# Patient Record
Sex: MALE | Race: WHITE | NOT HISPANIC OR LATINO | Employment: OTHER | URBAN - METROPOLITAN AREA
[De-identification: names, ages, dates, MRNs, and addresses within clinical notes are randomized per-mention and may not be internally consistent; named-entity substitution may affect disease eponyms.]

---

## 2018-11-21 ENCOUNTER — OFFICE VISIT (OUTPATIENT)
Dept: PODIATRY | Facility: CLINIC | Age: 53
End: 2018-11-21
Payer: COMMERCIAL

## 2018-11-21 VITALS
WEIGHT: 245 LBS | DIASTOLIC BLOOD PRESSURE: 81 MMHG | HEART RATE: 102 BPM | BODY MASS INDEX: 31.44 KG/M2 | HEIGHT: 74 IN | TEMPERATURE: 98 F | SYSTOLIC BLOOD PRESSURE: 118 MMHG

## 2018-11-21 DIAGNOSIS — E11.9 TYPE 2 DIABETES MELLITUS WITHOUT COMPLICATION, WITHOUT LONG-TERM CURRENT USE OF INSULIN: ICD-10-CM

## 2018-11-21 DIAGNOSIS — L97.511 ULCER OF GREAT TOE, RIGHT, LIMITED TO BREAKDOWN OF SKIN: Primary | ICD-10-CM

## 2018-11-21 PROCEDURE — 99214 OFFICE O/P EST MOD 30 MIN: CPT | Mod: S$GLB,,, | Performed by: PODIATRIST

## 2018-11-21 PROCEDURE — 99999 PR PBB SHADOW E&M-NEW PATIENT-LVL III: CPT | Mod: PBBFAC,,, | Performed by: PODIATRIST

## 2018-11-21 PROCEDURE — 3008F BODY MASS INDEX DOCD: CPT | Mod: S$GLB,,, | Performed by: PODIATRIST

## 2018-11-21 RX ORDER — METAXALONE 800 MG/1
TABLET ORAL
COMMUNITY
End: 2021-10-29

## 2018-11-21 RX ORDER — MUPIROCIN 20 MG/G
OINTMENT TOPICAL
Refills: 0 | COMMUNITY
Start: 2018-11-15

## 2018-11-21 RX ORDER — METOPROLOL TARTRATE 25 MG/1
TABLET, FILM COATED ORAL
Refills: 2 | COMMUNITY
Start: 2018-11-13

## 2018-11-21 RX ORDER — INSULIN DEGLUDEC 100 U/ML
INJECTION, SOLUTION SUBCUTANEOUS
Refills: 1 | COMMUNITY
Start: 2018-11-07 | End: 2021-10-29

## 2018-11-21 RX ORDER — DIAZEPAM 2 MG/1
TABLET ORAL
COMMUNITY

## 2018-11-21 RX ORDER — PANTOPRAZOLE SODIUM 40 MG/1
TABLET, DELAYED RELEASE ORAL
Refills: 2 | COMMUNITY
Start: 2018-10-25

## 2018-11-21 RX ORDER — ZOLPIDEM TARTRATE 10 MG/1
TABLET ORAL
COMMUNITY
End: 2021-10-29

## 2018-11-21 RX ORDER — ERGOCALCIFEROL 1.25 MG/1
CAPSULE ORAL
COMMUNITY

## 2018-11-21 RX ORDER — FENOFIBRATE 134 MG/1
CAPSULE ORAL
COMMUNITY
End: 2021-10-29 | Stop reason: SDUPTHER

## 2018-11-21 RX ORDER — MELOXICAM 7.5 MG/1
TABLET ORAL
COMMUNITY
End: 2021-10-29

## 2018-11-21 RX ORDER — PEN NEEDLE, DIABETIC 32 GX 1/6"
NEEDLE, DISPOSABLE MISCELLANEOUS
Refills: 5 | COMMUNITY
Start: 2018-09-18

## 2018-11-21 RX ORDER — PRAVASTATIN SODIUM 20 MG/1
TABLET ORAL
COMMUNITY

## 2018-11-21 RX ORDER — ESCITALOPRAM OXALATE 20 MG/1
TABLET ORAL
COMMUNITY

## 2018-11-21 RX ORDER — INSULIN DEGLUDEC 100 U/ML
INJECTION, SOLUTION SUBCUTANEOUS
COMMUNITY
End: 2021-10-29

## 2018-11-21 RX ORDER — LISINOPRIL 2.5 MG/1
TABLET ORAL
COMMUNITY
End: 2021-10-29

## 2018-11-21 RX ORDER — GABAPENTIN 800 MG/1
TABLET ORAL
COMMUNITY
End: 2021-10-29

## 2018-11-21 RX ORDER — CLINDAMYCIN PHOSPHATE 10 MG/G
GEL TOPICAL
Refills: 0 | COMMUNITY
Start: 2018-09-07

## 2018-11-21 RX ORDER — HYDROCODONE BITARTRATE AND ACETAMINOPHEN 10; 325 MG/1; MG/1
TABLET ORAL
COMMUNITY

## 2018-11-21 RX ORDER — LOVASTATIN 20 MG/1
TABLET ORAL
Refills: 2 | COMMUNITY
Start: 2018-11-13

## 2018-11-21 RX ORDER — INSULIN ASPART 100 [IU]/ML
INJECTION, SOLUTION INTRAVENOUS; SUBCUTANEOUS
Refills: 1 | COMMUNITY
Start: 2018-11-07

## 2018-11-21 RX ORDER — METFORMIN HYDROCHLORIDE 1000 MG/1
TABLET ORAL
COMMUNITY
End: 2021-10-29

## 2018-11-21 RX ORDER — DIVALPROEX SODIUM 500 MG/1
TABLET, DELAYED RELEASE ORAL
COMMUNITY
End: 2021-10-29 | Stop reason: SDUPTHER

## 2018-11-21 RX ORDER — FENOFIBRATE 134 MG/1
CAPSULE ORAL
Refills: 2 | COMMUNITY
Start: 2018-10-25

## 2018-11-21 RX ORDER — DOXYCYCLINE 100 MG/1
CAPSULE ORAL
Refills: 0 | COMMUNITY
Start: 2018-11-19 | End: 2021-10-29

## 2018-11-25 PROBLEM — L97.511 ULCER OF GREAT TOE, RIGHT, LIMITED TO BREAKDOWN OF SKIN: Status: ACTIVE | Noted: 2018-11-25

## 2018-11-25 PROBLEM — E11.9 TYPE 2 DIABETES MELLITUS WITHOUT COMPLICATION, WITHOUT LONG-TERM CURRENT USE OF INSULIN: Status: ACTIVE | Noted: 2018-11-25

## 2018-11-25 NOTE — PROGRESS NOTES
Subjective:       Patient ID: Seng Last is a 53 y.o. male.    Chief Complaint: Nail Problem; Foot Problem; Diabetes Mellitus; and Follow-up    Patient presents today for diabetic evaluation he states his diabetes has been up and down and is not very well controlled he relates that he had an ingrown toenail on his right great toe he states the nail was very loose he ripped the nail off it has subsequently become infected he states that he was initially placed on Cipro by primary care later this was changed to doxycycline which she is currently on.  Patient does have pain where he has a wound and signs of infection dorsal nail bed right hallux.  Patient also has toenail problems with an ingrown toenail on the left hallux.  HPI  Review of Systems   Musculoskeletal: Positive for arthralgias.   Neurological: Positive for numbness.   All other systems reviewed and are negative.      Objective:      Physical Exam   Constitutional: He appears well-developed and well-nourished.   Cardiovascular:   Pulses:       Dorsalis pedis pulses are 1+ on the right side, and 1+ on the left side.        Posterior tibial pulses are 1+ on the right side, and 1+ on the left side.   Pulmonary/Chest: Effort normal.   Musculoskeletal: Normal range of motion. He exhibits edema and tenderness.        Right foot: There is deformity.        Left foot: There is deformity.   Feet:   Right Foot:   Protective Sensation: 4 sites tested. 3 sites sensed.   Skin Integrity: Positive for ulcer, skin breakdown and erythema.   Left Foot:   Protective Sensation: 4 sites tested. 3 sites sensed.   Skin Integrity: Positive for erythema.   Neurological: He is alert.   Skin: Skin is warm. Capillary refill takes 2 to 3 seconds. There is erythema.   Psychiatric: He has a normal mood and affect. His behavior is normal. Judgment and thought content normal.   Nursing note and vitals reviewed.      On evaluation patient's nail plate on the right hallux is  completely missing there are signs of infection overlying the nail bed there is fibrous tissue with a thick serous drainage emitting from the right hallux nail bed with obvious signs of ulceration and infection the patient's left hallux nail is noted to be ingrown patient does have limited discomfort secondary to early neuropathy protective sensation is noted to be intact.  Assessment:       1. Ulcer of great toe, right, limited to breakdown of skin    2. Type 2 diabetes mellitus without complication, without long-term current use of insulin        Plan:         Following a complete diabetic evaluation patient was advised under no circumstances should he be ripping a toenail off obviously removing the toenail from the right hallux is cause an infection of the nail bed I did not perform a culture and sensitivity today patient has been on Cipro this was subsequently changed by primary care per a culture to doxycycline patient is currently on the doxycycline at this time so I did not perform a culture of the area the area was thoroughly cleaned with Dakin solution and Xeroform with a well-padded dressing was applied this needs to be changed every day patient was provided the dressing supply materials in Dakin solution needed I also removed an ingrown toenail from the left hallux.  Patient advised any increased redness swelling pain of the right great toe he is to contact me immediately I plan to see him for follow-up in 1 week patient indicated today his blood sugars have been up and down and not well controlled patient is at significant risk for infection and osteomyelitis with related complications because of his uncontrolled diabetes.  Total face-to-face time including discussion evaluation wound care and treatment equaled 25 min I did gently agitated the surface of the nail bed on the right removing a lot of the fibrous tissue which allowed for better cleaning of the area.  Patient advised this area will likely get  macerated with a Xeroform but at this point we need to keep the nail bed preserved and protected.

## 2018-11-28 ENCOUNTER — OFFICE VISIT (OUTPATIENT)
Dept: PODIATRY | Facility: CLINIC | Age: 53
End: 2018-11-28
Payer: COMMERCIAL

## 2018-11-28 VITALS
TEMPERATURE: 98 F | BODY MASS INDEX: 31.44 KG/M2 | DIASTOLIC BLOOD PRESSURE: 85 MMHG | WEIGHT: 245 LBS | SYSTOLIC BLOOD PRESSURE: 126 MMHG | HEIGHT: 74 IN | HEART RATE: 76 BPM

## 2018-11-28 DIAGNOSIS — E11.9 TYPE 2 DIABETES MELLITUS WITHOUT COMPLICATION, WITHOUT LONG-TERM CURRENT USE OF INSULIN: ICD-10-CM

## 2018-11-28 DIAGNOSIS — L97.511 ULCER OF GREAT TOE, RIGHT, LIMITED TO BREAKDOWN OF SKIN: Primary | ICD-10-CM

## 2018-11-28 PROCEDURE — 99213 OFFICE O/P EST LOW 20 MIN: CPT | Mod: S$GLB,,, | Performed by: PODIATRIST

## 2018-11-28 PROCEDURE — 3008F BODY MASS INDEX DOCD: CPT | Mod: S$GLB,,, | Performed by: PODIATRIST

## 2018-11-28 PROCEDURE — 99999 PR PBB SHADOW E&M-EST. PATIENT-LVL III: CPT | Mod: PBBFAC,,, | Performed by: PODIATRIST

## 2018-12-02 NOTE — PROGRESS NOTES
Subjective:       Patient ID: Seng Last is a 53 y.o. male.    Chief Complaint: Follow-up; Foot Ulcer; Foot Problem; and Diabetes Mellitus   Patient presents today for follow-up of an ulceration and infection the great toe.  Patient states he thinks it looks a lot better it feels a lot better and he is not having any discomfort.  Foot Ulcer   Associated symptoms include arthralgias and numbness.     Review of Systems   Musculoskeletal: Positive for arthralgias.   Neurological: Positive for numbness.   All other systems reviewed and are negative.      Objective:      Physical Exam   Constitutional: He appears well-developed and well-nourished.   Cardiovascular:   Pulses:       Dorsalis pedis pulses are 1+ on the right side, and 1+ on the left side.        Posterior tibial pulses are 1+ on the right side, and 1+ on the left side.   Pulmonary/Chest: Effort normal.   Musculoskeletal: Normal range of motion. He exhibits edema and tenderness.        Right foot: There is deformity.        Left foot: There is deformity.   Feet:   Right Foot:   Protective Sensation: 4 sites tested. 3 sites sensed.   Skin Integrity: Positive for ulcer, skin breakdown and erythema.   Left Foot:   Protective Sensation: 4 sites tested. 3 sites sensed.   Skin Integrity: Positive for erythema.   Neurological: He is alert.   Skin: Skin is warm. Capillary refill takes 2 to 3 seconds. There is erythema.   Psychiatric: He has a normal mood and affect. His behavior is normal. Judgment and thought content normal.   Nursing note and vitals reviewed.        Previous signs of infection nail bed right hallux appear well resolving at this time there is healthy granular tissue filling in the area no drainage noted decreased erythema and edema noted.  Assessment:       1. Ulcer of great toe, right, limited to breakdown of skin    2. Type 2 diabetes mellitus without complication, without long-term current use of insulin        Plan:         Following  evaluation patient is responding very well to treatment of infection right hallux I do want him to continue to clean the area with Dakin solution I want to continue to soak for 7-10 days is the area continues to heal keep a light dressing on the area he is finishing his doxycycline tomorrow I have advised him that should continue to completely resolve any increased redness pain swelling he is to contact me immediately otherwise I will see the patient as needed.  Total face-to-face time including discussion evaluation treatment and wound care of the right hallux equaled 15 min.  Patient advised any skin breaks cuts areas of inflammation on his lower extremities he should contact us immediately with this history of diabetes.

## 2021-10-26 ENCOUNTER — OFFICE VISIT (OUTPATIENT)
Dept: PODIATRY | Facility: CLINIC | Age: 56
End: 2021-10-26
Payer: COMMERCIAL

## 2021-10-26 VITALS
BODY MASS INDEX: 30.8 KG/M2 | DIASTOLIC BLOOD PRESSURE: 64 MMHG | HEART RATE: 81 BPM | WEIGHT: 240 LBS | SYSTOLIC BLOOD PRESSURE: 102 MMHG | HEIGHT: 74 IN | RESPIRATION RATE: 18 BRPM

## 2021-10-26 DIAGNOSIS — E11.9 COMPREHENSIVE DIABETIC FOOT EXAMINATION, TYPE 2 DM, ENCOUNTER FOR: ICD-10-CM

## 2021-10-26 DIAGNOSIS — L03.031 SUBUNGUAL ABSCESS OF TOE, RIGHT: Primary | ICD-10-CM

## 2021-10-26 DIAGNOSIS — L60.8 ACQUIRED DYSMORPHIC TOENAIL: ICD-10-CM

## 2021-10-26 DIAGNOSIS — R20.8 LOSS OF PROTECTIVE SENSATION OF SKIN OF FOOT: ICD-10-CM

## 2021-10-26 DIAGNOSIS — S91.209A TRAUMATIC AVULSION OF NAIL PLATE OF TOE, INITIAL ENCOUNTER: ICD-10-CM

## 2021-10-26 PROCEDURE — 1160F PR REVIEW ALL MEDS BY PRESCRIBER/CLIN PHARMACIST DOCUMENTED: ICD-10-PCS | Mod: S$GLB,,, | Performed by: PODIATRIST

## 2021-10-26 PROCEDURE — 3078F PR MOST RECENT DIASTOLIC BLOOD PRESSURE < 80 MM HG: ICD-10-PCS | Mod: S$GLB,,, | Performed by: PODIATRIST

## 2021-10-26 PROCEDURE — 99999 PR PBB SHADOW E&M-EST. PATIENT-LVL V: ICD-10-PCS | Mod: PBBFAC,,, | Performed by: PODIATRIST

## 2021-10-26 PROCEDURE — 3074F SYST BP LT 130 MM HG: CPT | Mod: S$GLB,,, | Performed by: PODIATRIST

## 2021-10-26 PROCEDURE — 3078F DIAST BP <80 MM HG: CPT | Mod: S$GLB,,, | Performed by: PODIATRIST

## 2021-10-26 PROCEDURE — 1159F MED LIST DOCD IN RCRD: CPT | Mod: S$GLB,,, | Performed by: PODIATRIST

## 2021-10-26 PROCEDURE — 3008F BODY MASS INDEX DOCD: CPT | Mod: S$GLB,,, | Performed by: PODIATRIST

## 2021-10-26 PROCEDURE — 1160F RVW MEDS BY RX/DR IN RCRD: CPT | Mod: S$GLB,,, | Performed by: PODIATRIST

## 2021-10-26 PROCEDURE — 1159F PR MEDICATION LIST DOCUMENTED IN MEDICAL RECORD: ICD-10-PCS | Mod: S$GLB,,, | Performed by: PODIATRIST

## 2021-10-26 PROCEDURE — 3074F PR MOST RECENT SYSTOLIC BLOOD PRESSURE < 130 MM HG: ICD-10-PCS | Mod: S$GLB,,, | Performed by: PODIATRIST

## 2021-10-26 PROCEDURE — 3008F PR BODY MASS INDEX (BMI) DOCUMENTED: ICD-10-PCS | Mod: S$GLB,,, | Performed by: PODIATRIST

## 2021-10-26 PROCEDURE — 99214 OFFICE O/P EST MOD 30 MIN: CPT | Mod: S$GLB,,, | Performed by: PODIATRIST

## 2021-10-26 PROCEDURE — 99214 PR OFFICE/OUTPT VISIT, EST, LEVL IV, 30-39 MIN: ICD-10-PCS | Mod: S$GLB,,, | Performed by: PODIATRIST

## 2021-10-26 PROCEDURE — 99999 PR PBB SHADOW E&M-EST. PATIENT-LVL V: CPT | Mod: PBBFAC,,, | Performed by: PODIATRIST

## 2021-10-26 RX ORDER — CANAGLIFLOZIN 300 MG/1
300 TABLET, FILM COATED ORAL DAILY
COMMUNITY
Start: 2021-10-06

## 2021-10-26 RX ORDER — PREGABALIN 200 MG/1
200 CAPSULE ORAL EVERY 12 HOURS
COMMUNITY
Start: 2021-10-03

## 2021-10-26 RX ORDER — ROSUVASTATIN CALCIUM 40 MG/1
40 TABLET, COATED ORAL DAILY
COMMUNITY
Start: 2021-09-21

## 2021-10-26 RX ORDER — BUSPIRONE HYDROCHLORIDE 15 MG/1
15 TABLET ORAL 3 TIMES DAILY
COMMUNITY
Start: 2021-10-05

## 2021-10-26 RX ORDER — SULFAMETHOXAZOLE AND TRIMETHOPRIM 800; 160 MG/1; MG/1
1 TABLET ORAL 2 TIMES DAILY
COMMUNITY
Start: 2021-10-20 | End: 2022-02-06

## 2021-10-26 RX ORDER — ONDANSETRON 4 MG/1
4 TABLET, FILM COATED ORAL EVERY 8 HOURS PRN
COMMUNITY
Start: 2021-08-12

## 2021-10-26 RX ORDER — PREGABALIN 150 MG/1
CAPSULE ORAL
COMMUNITY
Start: 2021-06-04 | End: 2021-10-29

## 2021-10-26 RX ORDER — FLASH GLUCOSE SENSOR
KIT MISCELLANEOUS
COMMUNITY
Start: 2021-10-07

## 2021-10-26 RX ORDER — OMEGA-3-ACID ETHYL ESTERS 1 G/1
2 CAPSULE, LIQUID FILLED ORAL 2 TIMES DAILY
COMMUNITY
Start: 2021-08-03

## 2021-10-26 RX ORDER — DIVALPROEX SODIUM 500 MG/1
500 TABLET, FILM COATED, EXTENDED RELEASE ORAL 2 TIMES DAILY
COMMUNITY
Start: 2021-10-05

## 2021-10-26 RX ORDER — PREGABALIN 100 MG/1
100 CAPSULE ORAL EVERY 12 HOURS
COMMUNITY
Start: 2021-05-28 | End: 2021-10-29

## 2021-10-26 RX ORDER — TRAZODONE HYDROCHLORIDE 50 MG/1
50 TABLET ORAL NIGHTLY
COMMUNITY
Start: 2021-09-21

## 2021-10-26 RX ORDER — SILDENAFIL 50 MG/1
50 TABLET, FILM COATED ORAL DAILY PRN
COMMUNITY
Start: 2021-08-12

## 2021-10-26 RX ORDER — KETOROLAC TROMETHAMINE 10 MG/1
10 TABLET, FILM COATED ORAL DAILY PRN
COMMUNITY
Start: 2021-06-04 | End: 2021-10-29

## 2021-10-26 RX ORDER — MELOXICAM 15 MG/1
15 TABLET ORAL DAILY
COMMUNITY
Start: 2021-09-21

## 2021-10-26 RX ORDER — LIDOCAINE AND PRILOCAINE 25; 25 MG/G; MG/G
CREAM TOPICAL DAILY
COMMUNITY
Start: 2021-06-04

## 2021-10-26 RX ORDER — CYCLOBENZAPRINE HCL 10 MG
TABLET ORAL
COMMUNITY
Start: 2021-09-25

## 2022-02-03 ENCOUNTER — OFFICE VISIT (OUTPATIENT)
Dept: PODIATRY | Facility: CLINIC | Age: 57
End: 2022-02-03
Payer: COMMERCIAL

## 2022-02-03 VITALS
WEIGHT: 240 LBS | HEART RATE: 83 BPM | SYSTOLIC BLOOD PRESSURE: 127 MMHG | HEIGHT: 74 IN | BODY MASS INDEX: 30.8 KG/M2 | RESPIRATION RATE: 18 BRPM | DIASTOLIC BLOOD PRESSURE: 89 MMHG

## 2022-02-03 DIAGNOSIS — L60.8 ACQUIRED DYSMORPHIC TOENAIL: ICD-10-CM

## 2022-02-03 DIAGNOSIS — S91.109A OPEN WOUND OF TOE, INITIAL ENCOUNTER: ICD-10-CM

## 2022-02-03 DIAGNOSIS — R20.8 LOSS OF PROTECTIVE SENSATION OF SKIN OF FOOT: ICD-10-CM

## 2022-02-03 DIAGNOSIS — L60.0 INGROWN NAIL OF GREAT TOE OF RIGHT FOOT: ICD-10-CM

## 2022-02-03 PROCEDURE — 1160F PR REVIEW ALL MEDS BY PRESCRIBER/CLIN PHARMACIST DOCUMENTED: ICD-10-PCS | Mod: S$GLB,,, | Performed by: PODIATRIST

## 2022-02-03 PROCEDURE — 3008F BODY MASS INDEX DOCD: CPT | Mod: S$GLB,,, | Performed by: PODIATRIST

## 2022-02-03 PROCEDURE — 99214 PR OFFICE/OUTPT VISIT, EST, LEVL IV, 30-39 MIN: ICD-10-PCS | Mod: S$GLB,,, | Performed by: PODIATRIST

## 2022-02-03 PROCEDURE — 99999 PR PBB SHADOW E&M-EST. PATIENT-LVL V: CPT | Mod: PBBFAC,,, | Performed by: PODIATRIST

## 2022-02-03 PROCEDURE — 3079F PR MOST RECENT DIASTOLIC BLOOD PRESSURE 80-89 MM HG: ICD-10-PCS | Mod: S$GLB,,, | Performed by: PODIATRIST

## 2022-02-03 PROCEDURE — 3008F PR BODY MASS INDEX (BMI) DOCUMENTED: ICD-10-PCS | Mod: S$GLB,,, | Performed by: PODIATRIST

## 2022-02-03 PROCEDURE — 1160F RVW MEDS BY RX/DR IN RCRD: CPT | Mod: S$GLB,,, | Performed by: PODIATRIST

## 2022-02-03 PROCEDURE — 3074F PR MOST RECENT SYSTOLIC BLOOD PRESSURE < 130 MM HG: ICD-10-PCS | Mod: S$GLB,,, | Performed by: PODIATRIST

## 2022-02-03 PROCEDURE — 3074F SYST BP LT 130 MM HG: CPT | Mod: S$GLB,,, | Performed by: PODIATRIST

## 2022-02-03 PROCEDURE — 99214 OFFICE O/P EST MOD 30 MIN: CPT | Mod: S$GLB,,, | Performed by: PODIATRIST

## 2022-02-03 PROCEDURE — 1159F MED LIST DOCD IN RCRD: CPT | Mod: S$GLB,,, | Performed by: PODIATRIST

## 2022-02-03 PROCEDURE — 3079F DIAST BP 80-89 MM HG: CPT | Mod: S$GLB,,, | Performed by: PODIATRIST

## 2022-02-03 PROCEDURE — 1159F PR MEDICATION LIST DOCUMENTED IN MEDICAL RECORD: ICD-10-PCS | Mod: S$GLB,,, | Performed by: PODIATRIST

## 2022-02-03 PROCEDURE — 99999 PR PBB SHADOW E&M-EST. PATIENT-LVL V: ICD-10-PCS | Mod: PBBFAC,,, | Performed by: PODIATRIST

## 2022-02-03 RX ORDER — DOXYCYCLINE 100 MG/1
100 CAPSULE ORAL EVERY 12 HOURS
Qty: 20 CAPSULE | Refills: 0 | Status: SHIPPED | OUTPATIENT
Start: 2022-02-03 | End: 2022-02-13

## 2022-02-03 RX ORDER — DAPAGLIFLOZIN 10 MG/1
10 TABLET, FILM COATED ORAL DAILY
COMMUNITY
Start: 2022-01-12

## 2022-02-06 NOTE — PROGRESS NOTES
Subjective:       Patient ID: Seng Last is a 56 y.o. male.    Chief Complaint: Follow-up, Diabetes Mellitus, and Wound Check  Patient presents for follow-up due to type 2 diabetes, follow-up of traumatic nail avulsion plate right hallux, and patient states he recently pulled part of the nail off on the right great toe with some bleeding.  Nurse identified a area of bleeding on the bottom of the right great toe this morning.  Patient has diminished  feeling in toes.  Relates diabetes is not well controlled, glucose was 170 today    Past Medical History:   Diagnosis Date    Diabetes mellitus, type 2     Hypertension      Past Surgical History:   Procedure Laterality Date    ADENOIDECTOMY      BACK SURGERY      CHOLECYSTECTOMY      gland surgery       LUNG SURGERY      NECK SURGERY      right knee surgery       sinud surgery       TONSILLECTOMY      VASECTOMY       Family History   Problem Relation Age of Onset    Diabetes Mother     Diabetes Brother     Diabetes Maternal Grandmother     Diabetes Maternal Grandfather      Social History     Socioeconomic History    Marital status:    Tobacco Use    Smoking status: Never Smoker    Smokeless tobacco: Current User     Types: Chew   Substance and Sexual Activity    Alcohol use: Yes    Drug use: No    Sexual activity: Yes     Partners: Female       Current Outpatient Medications   Medication Sig Dispense Refill    busPIRone (BUSPAR) 15 MG tablet Take 15 mg by mouth 3 (three) times daily.      clindamycin phosphate 1% (CLINDAGEL) 1 % gel APPLY A THIN FILM TO NAILBED BID  0    cyclobenzaprine (FLEXERIL) 10 MG tablet SMARTSI Tablet(s) By Mouth Every 12 Hours      diazePAM (VALIUM) 2 MG tablet diazepam 2 mg tablet   Take 1 tablet twice a day by oral route.      divalproex ER (DEPAKOTE) 500 MG Tb24 Take 500 mg by mouth 2 (two) times daily.      ergocalciferol (ERGOCALCIFEROL) 50,000 unit Cap ergocalciferol (vitamin D2) 50,000 unit  "capsule   TK 1 C PO Q WK      escitalopram oxalate (LEXAPRO) 20 MG tablet escitalopram 20 mg tablet   TK 1 T PO BID      FARXIGA 10 mg tablet Take 10 mg by mouth once daily.      fenofibrate micronized (LOFIBRA) 134 MG Cap TK 1 C PO D  2    FREESTYLE LEON 14 DAY SENSOR Kit Apply topically.      HYDROcodone-acetaminophen (NORCO)  mg per tablet hydrocodone 10 mg-acetaminophen 325 mg tablet   take 1 tablet by mouth every 8 hours if needed for pain      INVOKANA 300 mg Tab tablet Take 300 mg by mouth once daily.      LIDOcaine-prilocaine (EMLA) cream Apply topically once daily.      lovastatin (MEVACOR) 20 MG tablet TK 1 T PO D  2    meloxicam (MOBIC) 15 MG tablet Take 15 mg by mouth once daily.      metoprolol tartrate (LOPRESSOR) 25 MG tablet TK 1 T PO BID  2    mupirocin (BACTROBAN) 2 % ointment IVETT EXT AA TID  0    NOVOFINE PLUS 32 gauge x 1/6" Ndle USE QD AS DIRECTED  5    NOVOLOG FLEXPEN U-100 INSULIN 100 unit/mL InPn pen INJECT UNDER THE SKIN EACH MEAL PER SLIDING SCALE. MAX OF 40 UNITS PER DAY  1    omega-3 acid ethyl esters (LOVAZA) 1 gram capsule Take 2 capsules by mouth 2 (two) times daily.      ondansetron (ZOFRAN) 4 MG tablet Take 4 mg by mouth every 8 (eight) hours as needed.      pantoprazole (PROTONIX) 40 MG tablet TK 1 T PO D  2    pravastatin (PRAVACHOL) 20 MG tablet pravastatin 20 mg tablet   take 1 tablet by mouth once daily      pregabalin (LYRICA) 200 MG Cap Take 200 mg by mouth every 12 (twelve) hours.      rosuvastatin (CRESTOR) 40 MG Tab Take 40 mg by mouth once daily.      sildenafiL (VIAGRA) 50 MG tablet Take 50 mg by mouth daily as needed.      traZODone (DESYREL) 50 MG tablet Take 50 mg by mouth nightly.      doxycycline (VIBRAMYCIN) 100 MG Cap Take 1 capsule (100 mg total) by mouth every 12 (twelve) hours. for 10 days 20 capsule 0     No current facility-administered medications for this visit.     Review of patient's allergies indicates:  No Known " "Allergies    Review of Systems   HENT: Negative for congestion.    Respiratory: Negative for cough and shortness of breath.    Cardiovascular: Negative for leg swelling.   Musculoskeletal: Negative for gait problem.   All other systems reviewed and are negative.      Objective:      Vitals:    02/03/22 1013   BP: 127/89   Pulse: 83   Resp: 18   Weight: 108.9 kg (240 lb)   Height: 6' 2" (1.88 m)     Physical Exam  Vitals and nursing note reviewed.   Constitutional:       General: He is not in acute distress.  Cardiovascular:      Pulses:           Dorsalis pedis pulses are 2+ on the right side and 2+ on the left side.        Posterior tibial pulses are 2+ on the right side and 2+ on the left side.   Pulmonary:      Effort: Pulmonary effort is normal.   Musculoskeletal:      Right foot: Normal range of motion. No deformity.      Left foot: Normal range of motion. No deformity.   Feet:      Right foot:      Protective Sensation: 8 sites tested. 5 sites sensed.      Skin integrity: Skin breakdown and erythema (Dystrophic right hallux nail, evidence of old nail lateral aspect with erythema, no drainage or calor.  Small superficial wound/cut plantar right hallux with recent dried bleeding.  No sign of infection) present.      Toenail Condition: Right toenails are abnormally thick.      Left foot:      Protective Sensation: 8 sites tested. 5 sites sensed.      Skin integrity: Erythema (Dystrophic, thin damaged left hallux nail with some erythema on the lateral aspect.  No calor or drainage) present.      Toenail Condition: Left toenails are abnormally thick.   Skin:     Capillary Refill: Capillary refill takes less than 2 seconds.   Neurological:      Sensory: Sensory deficit present.      Comments: Diminished sensation digits bilateral feet, intact elsewhere with monofilament testing.  No paresthesias in feet.  Positive loss of protective sensation   Psychiatric:         Mood and Affect: Mood normal.         Behavior: " Behavior normal.                            Assessment:       1. DM type 2, uncontrolled, with neuropathy    2. Loss of protective sensation of skin of foot    3. Open wound of toe, initial encounter    4. Acquired dysmorphic toenail - Left Foot    5. Ingrown nail of great toe of right foot        Plan:         DOXYCYCLINE 100 MG B.I.D. TIMES 10 DAYS    Reviewed with patient lack of sensation in toes, high risk for complications.  Advised patient to avoid pulling, tearing skin or nail  Right hallux nail was debrided.  Lateral right hallux nail was cleansed with wound  along with superficial wound on the bottom of the toe.  Very small amount triple antibiotic ointment applied to both areas with a gauze and Coban dressing.  Instructed patient to leave intact until the morning.  Have his wife remove, inspect areas and apply a clean dry bandage only, no further antibiotic.  The dressing is to be changed daily and kept clean and dry.  Left hallux nail was debrided, small area of erythema within the nail bed is stable  Advised patient with multiple areas of concern recommend oral antibiotic as a precaution  Started on doxycycline  Reviewed diabetic education, neuropathy, lack of sensation in feet, high risk for potential complications, concern regarding infection and the need to check and protect feet daily  Reviewed benefits better control of daily glucose/A1c/diabetes  Reviewed appropriate shoes, especially indoors to protect feet    Instructed patient to contact the office with any area of redness or swelling which has not improved within 3 days.  Patient/family were in understanding and agreement with treatment plan.  I counseled the patient on their conditions, implications and medical management.  Instructed patient/family to contact the office with any changes, questions, concerns, worsening of symptoms.   Total face-to-face time, exam, assessment, treatment, discussion, documentation 30 minutes, more than  half this time spent on consultation and coordination of care.  Follow up 3 months      This note was created using "[employer] voice recognition software that occasionally misinterpreted phrases or words.